# Patient Record
Sex: MALE | Race: WHITE | Employment: FULL TIME | URBAN - METROPOLITAN AREA
[De-identification: names, ages, dates, MRNs, and addresses within clinical notes are randomized per-mention and may not be internally consistent; named-entity substitution may affect disease eponyms.]

---

## 2018-06-20 ENCOUNTER — TELEPHONE (OUTPATIENT)
Dept: FAMILY MEDICINE CLINIC | Age: 23
End: 2018-06-20

## 2018-06-28 ENCOUNTER — OFFICE VISIT (OUTPATIENT)
Dept: FAMILY MEDICINE CLINIC | Age: 23
End: 2018-06-28

## 2018-06-28 VITALS
HEIGHT: 70 IN | WEIGHT: 146.6 LBS | SYSTOLIC BLOOD PRESSURE: 110 MMHG | BODY MASS INDEX: 20.99 KG/M2 | TEMPERATURE: 98 F | DIASTOLIC BLOOD PRESSURE: 60 MMHG | RESPIRATION RATE: 16 BRPM | HEART RATE: 64 BPM

## 2018-06-28 DIAGNOSIS — L30.9 ECZEMA, UNSPECIFIED TYPE: Primary | ICD-10-CM

## 2018-06-28 DIAGNOSIS — B36.0 TV (TINEA VERSICOLOR): ICD-10-CM

## 2018-06-28 DIAGNOSIS — B35.4 TINEA CORPORIS: ICD-10-CM

## 2018-06-28 DIAGNOSIS — K64.9 HEMORRHOIDS, UNSPECIFIED HEMORRHOID TYPE: ICD-10-CM

## 2018-06-28 PROCEDURE — 99203 OFFICE O/P NEW LOW 30 MIN: CPT | Performed by: NURSE PRACTITIONER

## 2018-06-28 PROCEDURE — 96372 THER/PROPH/DIAG INJ SC/IM: CPT | Performed by: NURSE PRACTITIONER

## 2018-06-28 RX ORDER — TRIAMCINOLONE ACETONIDE 1 MG/G
CREAM TOPICAL 2 TIMES DAILY
COMMUNITY
End: 2018-06-28

## 2018-06-28 RX ORDER — METHYLPREDNISOLONE ACETATE 80 MG/ML
120 INJECTION, SUSPENSION INTRA-ARTICULAR; INTRALESIONAL; INTRAMUSCULAR; SOFT TISSUE ONCE
Status: COMPLETED | OUTPATIENT
Start: 2018-06-28 | End: 2018-06-28

## 2018-06-28 RX ORDER — PREDNISONE 1 MG/1
TABLET ORAL
Qty: 30 TABLET | Refills: 0 | Status: SHIPPED | OUTPATIENT
Start: 2018-06-28 | End: 2018-07-08

## 2018-06-28 RX ORDER — LANOLIN ALCOHOL/MO/W.PET/CERES
CREAM (GRAM) TOPICAL PRN
COMMUNITY
End: 2018-12-18

## 2018-06-28 RX ORDER — CETIRIZINE HYDROCHLORIDE 10 MG/1
10 TABLET ORAL DAILY
Qty: 30 TABLET | Refills: 1 | Status: SHIPPED | OUTPATIENT
Start: 2018-06-28 | End: 2018-12-18

## 2018-06-28 RX ORDER — KETOCONAZOLE 20 MG/ML
SHAMPOO TOPICAL
Qty: 120 ML | Refills: 1 | Status: SHIPPED | OUTPATIENT
Start: 2018-06-28 | End: 2018-12-18

## 2018-06-28 RX ORDER — CLOBETASOL PROPIONATE 0.5 MG/G
OINTMENT TOPICAL
Qty: 60 G | Refills: 3 | Status: SHIPPED | OUTPATIENT
Start: 2018-06-28 | End: 2018-12-18

## 2018-06-28 RX ORDER — CLOBETASOL PROPIONATE 0.5 MG/G
CREAM TOPICAL 2 TIMES DAILY
COMMUNITY
End: 2018-06-28

## 2018-06-28 RX ORDER — FLUCONAZOLE 150 MG/1
150 TABLET ORAL
Qty: 10 TABLET | Refills: 0 | Status: SHIPPED | OUTPATIENT
Start: 2018-06-28 | End: 2018-07-28

## 2018-06-28 RX ORDER — CLOBETASOL PROPIONATE 0.5 MG/G
CREAM TOPICAL 2 TIMES DAILY
Qty: 3 TUBE | Refills: 3 | Status: CANCELLED | OUTPATIENT
Start: 2018-06-28

## 2018-06-28 RX ADMIN — METHYLPREDNISOLONE ACETATE 120 MG: 80 INJECTION, SUSPENSION INTRA-ARTICULAR; INTRALESIONAL; INTRAMUSCULAR; SOFT TISSUE at 08:47

## 2018-06-28 ASSESSMENT — PATIENT HEALTH QUESTIONNAIRE - PHQ9
2. FEELING DOWN, DEPRESSED OR HOPELESS: 0
SUM OF ALL RESPONSES TO PHQ9 QUESTIONS 1 & 2: 0
SUM OF ALL RESPONSES TO PHQ QUESTIONS 1-9: 0
1. LITTLE INTEREST OR PLEASURE IN DOING THINGS: 0

## 2018-06-28 NOTE — PROGRESS NOTES
After obtaining consent, and per orders of  Dominick Aguilar. CNP injection of depomedrol 120mg  given in Right deltoid by Corinne Stern. Patient instructed to  report any adverse reaction to me immediately.

## 2018-06-28 NOTE — PATIENT INSTRUCTIONS
Patient Education        Tinea Versicolor: Care Instructions  Your Care Instructions  Tinea versicolor is a skin infection caused by a yeast (fungus). It causes many small spots, usually on the chest and back. The spotted skin can be flaky or scaly. The spots do not tan in the sun, so they are lighter than the skin around them. Some spots may be darker than the skin around them. The yeast that causes tinea versicolor normally lives on your skin. But it becomes a problem only when warmth and humidity allow the yeast to grow rapidly and increase in number. Some people are more likely to get tinea versicolor. It does not spread from person to person. Tinea versicolor usually gets better as you age. You can treat tinea versicolor with cream or ointment that kills the yeast. You may need pills to kill the fungus if the spots cover a lot of your body. Although treatment kills the yeast quickly, your skin may not return to normal for months after treatment. You can get this condition again after treatment. Follow-up care is a key part of your treatment and safety. Be sure to make and go to all appointments, and call your doctor if you are having problems. It's also a good idea to know your test results and keep a list of the medicines you take. How can you care for yourself at home? · Follow the directions for use of creams, shampoos, or solutions. You will probably need to use them for 1 to 2 weeks. If your skin gets irritated, stop using the product, and call your doctor. · To prevent tinea versicolor, use a cream, shampoo, or solution one time a month. Your doctor may prescribe pills to prevent the spots from returning. · Dry off well after bathing. Keep your skin clean and dry. · Always wear sunscreen on exposed skin. Make sure to use a broad-spectrum sunscreen that has a sun protection factor (SPF) of 30 or higher. Use it every day, even when it is cloudy.   · If you keep getting tinea versicolor, wash your clothes in very hot water to kill the yeast.  When should you call for help? Call your doctor now or seek immediate medical care if:    · You have signs of infection such as:  ¨ Pain, warmth, or swelling in your skin. ¨ Red streaks near a wound in your skin. ¨ Pus coming from a wound in your skin. ¨ A fever.    Watch closely for changes in your health, and be sure to contact your doctor if:    · Your skin condition does not improve in 2 weeks.     · You do not get better as expected. Where can you learn more? Go to [email or web address]://[email or web address].[email or web address] org and sign in to your [employer] account. Enter Q482 in the iDentiMob box to learn more about \"Tinea Versicolor: Care Instructions. \"     If you do not have an account, please click on the \"Sign Up Now\" link. Current as of: October 5, 2017  Content Version: 11.6  © 7087-5278 [employer]. Care instructions adapted under license by [facility] ([facility]). If you have questions about a medical condition or this instruction, always ask your healthcare professional. [place] [address] any warranty or liability for your use of this information. Patient Education        Ringworm: Care Instructions  Your Care Instructions  Ringworm is a fungus infection of the skin. It is not caused by a worm. Ringworm causes a round, scaly rash that may crack and itch. The rash can spread over a wide area. One type of fungus that causes ringworm is often found in locker rooms and swimming pools. It grows well in warm, moist areas of the skin, such as in skin folds. You can get ringworm by sharing towels, clothing, and sports equipment. You can also get it by touching someone who has ringworm. Ringworm is treated with cream that kills the fungus. If the rash is widespread, you may need pills to get rid of it. Ringworm often comes back after treatment. If the rash becomes infected with bacteria, you may need antibiotics.   Follow-up care is a key link.  Current as of: October 5, 2017  Content Version: 11.6  © 7522-1614 [employer]. Care instructions adapted under license by [facility] ([facility]). If you have questions about a medical condition or this instruction, always ask your healthcare professional. [address] any warranty or liability for your use of this information. Patient Education        Atopic Dermatitis: Care Instructions  Your Care Instructions  Atopic dermatitis (also called eczema) is a skin problem that causes intense itching and a red, raised rash. In severe cases, the rash develops clear fluid-filled blisters. The rash is not contagious. People with this condition seem to have very sensitive immune systems that are likely to react to things that cause allergies. The immune system is the body's way of fighting infection. There is no cure for atopic dermatitis, but you may be able to control it with care at home. Follow-up care is a key part of your treatment and safety. Be sure to make and go to all appointments, and call your doctor if you are having problems. It's also a good idea to know your test results and keep a list of the medicines you take. How can you care for yourself at home? · Use moisturizer at least twice a day. · If your doctor prescribes a cream, use it as directed. If your doctor prescribes other medicine, take it exactly as directed. · Wash the affected area with water only. Soap can make dryness and itching worse. Pat dry. · Apply a moisturizer after bathing. Use a cream such as Lubriderm, Moisturel, or Cetaphil that does not irritate the skin or cause a rash. Apply the cream while your skin is still damp after lightly drying with a towel. · Use cold, wet cloths to reduce itching. · Keep cool, and stay out of the sun. · If itching affects your normal activities, an over-the-counter antihistamine, such as diphenhydramine (Benadryl) or loratadine (Claritin) may help.  Read and follow all instructions on the label. When should you call for help? Call your doctor now or seek immediate medical care if:    · Your rash gets worse and you have a fever.     · You have new blisters or bruises, or the rash spreads and looks like a sunburn.     · You have signs of infection, such as:  ¨ Increased pain, swelling, warmth, or redness. ¨ Red streaks leading from the rash. ¨ Pus draining from the rash. ¨ A fever.     · You have crusting or oozing sores.     · You have joint aches or body aches along with your rash.    Watch closely for changes in your health, and be sure to contact your doctor if:    · Your rash does not clear up after 2 to 3 weeks of home treatment.     · Itching interferes with your sleep or daily activities. Where can you learn more? Go to [email or web address].[email or web address] and sign in to your LearnUp account. Enter O324 in the [employer] box to learn more about \"Atopic Dermatitis: Care Instructions. \"     If you do not have an account, please click on the \"Sign Up Now\" link. Current as of: October 5, 2017  Content Version: 11.6  © 1502-7383 [employer]. Care instructions adapted under license by [facility] ([facility]). If you have questions about a medical condition or this instruction, always ask your healthcare professional. [place] [address] any warranty or liability for your use of this information. Patient Education        Hemorrhoids: Care Instructions  Your Care Instructions    Hemorrhoids are enlarged veins that develop in the anal canal. Bleeding during bowel movements, itching, swelling, and rectal pain are the most common symptoms. They can be uncomfortable at times, but hemorrhoids rarely are a serious problem. You can treat most hemorrhoids with simple changes to your diet and bowel habits. These changes include eating more fiber and not straining to pass stools.  Most hemorrhoids do not need surgery or other treatment unless they are very large and painful or bleed a lot. Follow-up care is a key part of your treatment and safety. Be sure to make and go to all appointments, and call your doctor if you are having problems. It's also a good idea to know your test results and keep a list of the medicines you take. How can you care for yourself at home? · Sit in a few inches of warm water (sitz bath) 3 times a day and after bowel movements. The warm water helps with pain and itching. · Put ice on your anal area several times a day for 10 minutes at a time. Put a thin cloth between the ice and your skin. Follow this by placing a warm, wet towel on the area for another 10 to 20 minutes. · Take pain medicines exactly as directed. ¨ If the doctor gave you a prescription medicine for pain, take it as prescribed. ¨ If you are not taking a prescription pain medicine, ask your doctor if you can take an over-the-counter medicine. · Keep the anal area clean, but be gentle. Use water and a fragrance-free soap, such as [place], or use baby wipes or medicated pads, such as Tucks. · Wear cotton underwear and loose clothing to decrease moisture in the anal area. · Eat more fiber. Include foods such as whole-grain breads and cereals, raw vegetables, raw and dried fruits, and beans. · Drink plenty of fluids, enough so that your urine is light yellow or clear like water. If you have kidney, heart, or liver disease and have to limit fluids, talk with your doctor before you increase the amount of fluids you drink. · Use a stool softener that contains bran or psyllium. You can save money by buying bran or psyllium (available in bulk at most health food stores) and sprinkling it on foods or stirring it into fruit juice. Or you can use a product such as Metamucil or Hydrocil. · Practice healthy bowel habits. ¨ Go to the bathroom as soon as you have the urge. ¨ Avoid straining to pass stools.  Relax and give yourself time to let things

## 2018-07-05 ASSESSMENT — ENCOUNTER SYMPTOMS
EYES NEGATIVE: 1
ANAL BLEEDING: 0
GASTROINTESTINAL NEGATIVE: 1
BLOOD IN STOOL: 0
RESPIRATORY NEGATIVE: 1

## 2018-07-05 NOTE — PROGRESS NOTES
(ZYRTEC ALLERGY) 10 MG tablet Take 1 tablet by mouth daily 30 tablet 1    fluconazole (DIFLUCAN) 150 MG tablet Take 1 tablet by mouth every 3 days 10 tablet 0    hydrocortisone 2.5 % cream Apply topically 2 times daily. 1 Tube 1     No current facility-administered medications for this visit. No Known Allergies  Health Maintenance   Topic Date Due    HIV screen  2012    Meningococcal (MCV) Vaccine Age 0-22 Years (1 of 1) 2013    DTaP/Tdap/Td vaccine (1 - Tdap) 2016    Flu vaccine (1) 2018         Objective:     Physical Exam   Constitutional: He is oriented to person, place, and time. He appears well-developed and well-nourished. HENT:   Head: Normocephalic. Mouth/Throat: Oropharynx is clear and moist.   Eyes: Conjunctivae are normal.   Neck: Neck supple. Cardiovascular: Normal rate, regular rhythm, normal heart sounds and intact distal pulses. Pulmonary/Chest: Effort normal and breath sounds normal.   Abdominal: Soft. Musculoskeletal: Normal range of motion. Neurological: He is alert and oriented to person, place, and time. Skin: Skin is warm and dry. Rash noted. There is erythema. Confluent hyperpigmented  Rash to trunk    Psychiatric: He has a normal mood and affect. Nursing note and vitals reviewed. /60   Pulse 64   Temp 98 °F (36.7 °C) (Oral)   Resp 16   Ht 5' 10\" (1.778 m)   Wt 146 lb 9.6 oz (66.5 kg)   BMI 21.03 kg/m²       Impression/Plan:  1. Eczema, unspecified type    2. Tinea corporis    3. TV (tinea versicolor)    4. Hemorrhoids, unspecified hemorrhoid type      Requested Prescriptions     Signed Prescriptions Disp Refills    ketoconazole (NIZORAL) 2 % shampoo 120 mL 1     Sig: Apply topically daily as needed.  clobetasol (TEMOVATE) 0.05 % ointment 60 g 3     Sig: Apply topically 2 times daily.     predniSONE (DELTASONE) 5 MG tablet 30 tablet 0     Si po qd for 3 days, then 3 po qd for 3 days, then 2 po qd for 3 days, then 1 po qd for 3 days    cetirizine (ZYRTEC ALLERGY) 10 MG tablet 30 tablet 1     Sig: Take 1 tablet by mouth daily    fluconazole (DIFLUCAN) 150 MG tablet 10 tablet 0     Sig: Take 1 tablet by mouth every 3 days     No orders of the defined types were placed in this encounter. Patient given educational materials - see patient instructions. Discussed use, benefit, and side effects of prescribed medications. All patient questions answered. Pt voiced understanding. Reviewed health maintenance. Patient agreed with treatment plan. Follow up as directed.           Electronically signed by GABRIEL Logan CNP on 7/5/2018 at 8:58 AM

## 2018-12-18 ENCOUNTER — OFFICE VISIT (OUTPATIENT)
Dept: FAMILY MEDICINE CLINIC | Age: 23
End: 2018-12-18

## 2018-12-18 VITALS
TEMPERATURE: 98.9 F | RESPIRATION RATE: 16 BRPM | WEIGHT: 155 LBS | SYSTOLIC BLOOD PRESSURE: 100 MMHG | HEART RATE: 72 BPM | BODY MASS INDEX: 21.7 KG/M2 | DIASTOLIC BLOOD PRESSURE: 60 MMHG | HEIGHT: 71 IN

## 2018-12-18 DIAGNOSIS — L30.9 ECZEMA, UNSPECIFIED TYPE: Primary | ICD-10-CM

## 2018-12-18 PROCEDURE — 99213 OFFICE O/P EST LOW 20 MIN: CPT | Performed by: NURSE PRACTITIONER

## 2018-12-18 PROCEDURE — 96372 THER/PROPH/DIAG INJ SC/IM: CPT | Performed by: NURSE PRACTITIONER

## 2018-12-18 RX ORDER — CETIRIZINE HYDROCHLORIDE 10 MG/1
10 TABLET ORAL DAILY
Qty: 30 TABLET | Refills: 5 | Status: SHIPPED | OUTPATIENT
Start: 2018-12-18 | End: 2019-10-30 | Stop reason: SDUPTHER

## 2018-12-18 RX ORDER — METHYLPREDNISOLONE ACETATE 80 MG/ML
120 INJECTION, SUSPENSION INTRA-ARTICULAR; INTRALESIONAL; INTRAMUSCULAR; SOFT TISSUE ONCE
Status: COMPLETED | OUTPATIENT
Start: 2018-12-18 | End: 2018-12-18

## 2018-12-18 RX ADMIN — METHYLPREDNISOLONE ACETATE 120 MG: 80 INJECTION, SUSPENSION INTRA-ARTICULAR; INTRALESIONAL; INTRAMUSCULAR; SOFT TISSUE at 09:41

## 2018-12-18 NOTE — PATIENT INSTRUCTIONS
you care for yourself at home? Showers and baths  · Keep showers and baths short, and use warm or lukewarm water. Don't use hot water. It takes off more of your skin's natural oils. · Use as little soap as you can. Choose a mild soap, such as Dove, Cetaphil, or Neutrogena. Or use a skin cleanser like Aquanil or Cetaphil. · If you are taking a bath, use soap only at the very end. Then rinse off all traces of soap with fresh water. Gently pat your skin dry with a towel. Skin creams and moisturizers  · Apply moisturizer or skin cream right away (within 3 minutes) after a bath or shower. Use a moisturizer at other times too, as often as you need it. · Moisturizing creams are better than lotions. Try brands like CeraVe cream, Cetaphil cream, or Eucerin cream.  Other tips  · When washing clothes, use a small amount of detergent. Don't use fabric softeners or dryer sheets. · For small areas of itchy skin, try an over-the-counter 1% hydrocortisone cream.  · If you have very dry hands, spread petroleum jelly (such as Vaseline) on your hands before bed. Wear thin cotton gloves while you sleep. If your feet are dry, spread Vaseline on them and wear socks while you sleep. When should you call for help? Call your doctor now or seek immediate medical care if:    · You have signs of infection, such as:  ? Pain, warmth, or swelling in the skin. ? Red streaks near a wound in the skin. ? Pus coming from a wound in your skin. ? A fever.    Watch closely for changes in your health, and be sure to contact your doctor if:    · You do not get better as expected. Where can you learn more? Go to https://Cryptopay.SEJENT. org and sign in to your [employer] account. Enter U223 in the [facility] box to learn more about \"Dry Skin: Care Instructions. \"     If you do not have an account, please click on the \"Sign Up Now\" link.   Current as of: April 18, 2018  Content Version: 11.8  © 1754-4883 Healthwise,

## 2018-12-28 NOTE — PROGRESS NOTES
09/01/2018         Objective:     Physical Exam   Constitutional: He is oriented to person, place, and time. He appears well-developed and well-nourished. HENT:   Head: Normocephalic. Right Ear: External ear normal.   Left Ear: External ear normal.   Mouth/Throat: Oropharynx is clear and moist.   Eyes: Pupils are equal, round, and reactive to light. Conjunctivae are normal.   Neck: Normal range of motion. Cardiovascular: Normal rate, regular rhythm, normal heart sounds and intact distal pulses. Pulmonary/Chest: Effort normal and breath sounds normal.   Abdominal: Soft. Musculoskeletal: Normal range of motion. Neurological: He is alert and oriented to person, place, and time. Skin: Rash noted. Erythematous, eczematous, confluent rash noted to arms and chest   Psychiatric: He has a normal mood and affect. Nursing note and vitals reviewed. /60   Pulse 72   Temp 98.9 °F (37.2 °C) (Oral)   Resp 16   Ht 5' 11\" (1.803 m)   Wt 155 lb (70.3 kg)   BMI 21.62 kg/m²       Impression/Plan:  1. Eczema, unspecified type      Requested Prescriptions     Signed Prescriptions Disp Refills    cetirizine (ZYRTEC ALLERGY) 10 MG tablet 30 tablet 5     Sig: Take 1 tablet by mouth daily    hydrocortisone 2.5 % cream 1 Tube 2     Sig: Apply topically 2 times daily. No orders of the defined types were placed in this encounter. Patient giveneducational materials - see patient instructions. Discussed use, benefit, and side effects of prescribed medications. All patient questions answered. Pt voiced understanding. Reviewed health maintenance. Patient agreedwith treatment plan. Follow up as directed. Continue medications as prescribed.  Educational information on above diagnosis  provided per AVS.         Electronically signed by GABRIEL Luna CNP on 12/28/2018 at 9:21 AM

## 2019-10-31 RX ORDER — CETIRIZINE HYDROCHLORIDE 10 MG/1
10 TABLET ORAL DAILY
Qty: 30 TABLET | Refills: 1 | Status: SHIPPED | OUTPATIENT
Start: 2019-10-31 | End: 2020-08-13 | Stop reason: SDUPTHER

## 2020-02-17 ENCOUNTER — OFFICE VISIT (OUTPATIENT)
Dept: FAMILY MEDICINE CLINIC | Age: 25
End: 2020-02-17

## 2020-02-17 VITALS
HEART RATE: 66 BPM | BODY MASS INDEX: 21.29 KG/M2 | HEIGHT: 72 IN | RESPIRATION RATE: 16 BRPM | SYSTOLIC BLOOD PRESSURE: 115 MMHG | DIASTOLIC BLOOD PRESSURE: 64 MMHG | TEMPERATURE: 98.1 F | WEIGHT: 157.2 LBS

## 2020-02-17 PROCEDURE — 99213 OFFICE O/P EST LOW 20 MIN: CPT | Performed by: NURSE PRACTITIONER

## 2020-02-17 SDOH — ECONOMIC STABILITY: FOOD INSECURITY: WITHIN THE PAST 12 MONTHS, THE FOOD YOU BOUGHT JUST DIDN'T LAST AND YOU DIDN'T HAVE MONEY TO GET MORE.: NEVER TRUE

## 2020-02-17 SDOH — ECONOMIC STABILITY: INCOME INSECURITY: HOW HARD IS IT FOR YOU TO PAY FOR THE VERY BASICS LIKE FOOD, HOUSING, MEDICAL CARE, AND HEATING?: NOT HARD AT ALL

## 2020-02-17 SDOH — ECONOMIC STABILITY: FOOD INSECURITY: WITHIN THE PAST 12 MONTHS, YOU WORRIED THAT YOUR FOOD WOULD RUN OUT BEFORE YOU GOT MONEY TO BUY MORE.: NEVER TRUE

## 2020-02-17 SDOH — ECONOMIC STABILITY: TRANSPORTATION INSECURITY
IN THE PAST 12 MONTHS, HAS THE LACK OF TRANSPORTATION KEPT YOU FROM MEDICAL APPOINTMENTS OR FROM GETTING MEDICATIONS?: NO

## 2020-02-17 SDOH — ECONOMIC STABILITY: TRANSPORTATION INSECURITY
IN THE PAST 12 MONTHS, HAS LACK OF TRANSPORTATION KEPT YOU FROM MEETINGS, WORK, OR FROM GETTING THINGS NEEDED FOR DAILY LIVING?: NO

## 2020-02-17 ASSESSMENT — PATIENT HEALTH QUESTIONNAIRE - PHQ9
SUM OF ALL RESPONSES TO PHQ QUESTIONS 1-9: 0
SUM OF ALL RESPONSES TO PHQ QUESTIONS 1-9: 0
SUM OF ALL RESPONSES TO PHQ9 QUESTIONS 1 & 2: 0
1. LITTLE INTEREST OR PLEASURE IN DOING THINGS: 0
2. FEELING DOWN, DEPRESSED OR HOPELESS: 0

## 2020-02-17 ASSESSMENT — ENCOUNTER SYMPTOMS
RESPIRATORY NEGATIVE: 1
EYES NEGATIVE: 1
GASTROINTESTINAL NEGATIVE: 1

## 2020-02-17 NOTE — PROGRESS NOTES
[address] [place] [address]  Dept: [phone]  Dept Fax: [phone]  Loc: [phone]  PROGRESS NOTE      VisitDate: 2/17/2020    Israel Lopez is a 27 y.o. 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 Called off. Requesting return to work slip. Reports symptoms have resolved. Denies any vomiting, fever, blood in stools or travel. reports several episodes of hive formation on chest after eating bananas. Moderate relief with benadryl use. Denies any angioedema, sob. Requesting allergy testing. Review of Systems   Constitutional: Negative. HENT: Negative. Eyes: Negative. Respiratory: Negative. Cardiovascular: Negative. Gastrointestinal: Negative. Endocrine: Negative. Genitourinary: Negative. Musculoskeletal: Negative. Skin: Negative. Neurological: Negative. Hematological: Negative. Psychiatric/Behavioral: Negative. No past medical history on file. No past surgical history on file. Family History   Problem Relation Age of Onset    High Blood Pressure Father     Heart Disease Paternal Grandmother     Cancer Paternal Grandmother      Social History     Tobacco Use    Smoking status: Never Smoker    Smokeless tobacco: Never Used   Substance Use Topics    Alcohol use: No      Current Outpatient Medications   Medication Sig Dispense Refill    hydrocortisone 2.5 % cream Apply topically 2 times daily.  1 Tube 2    cetirizine (ZYRTEC ALLERGY) 10 MG tablet Take 1 tablet by mouth daily 30 tablet 1     No current facility-administered medications for this visit. Allergies   Allergen Reactions    Banana Extract Allergy Skin Test Hives and Swelling     Health Maintenance   Topic Date Due    Varicella vaccine (1 of 2 - 2-dose childhood series) 08/21/1998    DTaP/Tdap/Td vaccine (1 - Tdap) 08/21/2008    HIV screen  08/21/2012    Flu vaccine (1) 09/01/2019    Shingles Vaccine (1 of 2) 08/21/2047    Hepatitis A vaccine  Aged Out    Hepatitis B vaccine  Aged Out    Hib vaccine  Aged Out    HPV vaccine  Aged Out    Meningococcal (ACWY) vaccine  Aged Out    Pneumococcal 0-64 years Vaccine  Aged Out         Objective:     Physical Exam  Vitals signs and nursing note reviewed. Constitutional:       Appearance: Normal appearance. He is normal weight. HENT:      Right Ear: Tympanic membrane normal.      Left Ear: Tympanic membrane normal.      Nose: Nose normal.      Mouth/Throat:      Pharynx: Oropharynx is clear. Eyes:      Conjunctiva/sclera: Conjunctivae normal.      Pupils: Pupils are equal, round, and reactive to light. Neck:      Musculoskeletal: Normal range of motion and neck supple. Cardiovascular:      Rate and Rhythm: Normal rate and regular rhythm. Pulses: Normal pulses. Heart sounds: Normal heart sounds. Pulmonary:      Effort: Pulmonary effort is normal.      Breath sounds: Normal breath sounds. Abdominal:      General: Bowel sounds are normal.      Palpations: Abdomen is soft. Musculoskeletal: Normal range of motion. Skin:     General: Skin is warm. Capillary Refill: Capillary refill takes 2 to 3 seconds. Neurological:      General: No focal deficit present. Mental Status: He is alert and oriented to person, place, and time. Psychiatric:         Mood and Affect: Mood normal.         Behavior: Behavior normal.         Thought Content:  Thought content normal.         Judgment: Judgment normal.       /64 (Site: Right Upper Arm)   Pulse 66   Temp 98.1 °F (36.7 °C) (Oral)   Resp 16   Ht 6' (1.829

## 2020-02-17 NOTE — PATIENT INSTRUCTIONS
severe allergic reaction.    After you give an epinephrine shot, call  911, even if you feel better.   Call 911 anytime you think you may need emergency care. For example, call if:    · You have symptoms of a severe allergic reaction. These may include:  ? Sudden raised, red areas (hives) all over your body. ? Swelling of the throat, mouth, lips, or tongue. ? Trouble breathing. ? Passing out (losing consciousness). Or you may feel very lightheaded or suddenly feel weak, confused, or restless.     · You have been given an epinephrine shot, even if you feel better.    Call your doctor now or seek immediate medical care if:    · You have symptoms of an allergic reaction, such as:  ? A rash or hives (raised, red areas on the skin). ? Itching. ? Swelling. ? Belly pain, nausea, or vomiting.    Watch closely for changes in your health, and be sure to contact your doctor if:    · You do not get better as expected. Where can you learn more? Go to https://Answers Corporation.Safecare. org and sign in to your [employer] account. Enter A762 in the Glooko box to learn more about \"Food Allergy: Care Instructions. \"     If you do not have an account, please click on the \"Sign Up Now\" link. Current as of: April 7, 2019  Content Version: 12.3  © 1048-5588 Healthwise, Incorporated. Care instructions adapted under license by [facility] ([facility]). If you have questions about a medical condition or this instruction, always ask your healthcare professional. [place] [address] any warranty or liability for your use of this information.

## 2020-08-13 ENCOUNTER — OFFICE VISIT (OUTPATIENT)
Dept: FAMILY MEDICINE CLINIC | Age: 25
End: 2020-08-13

## 2020-08-13 VITALS
WEIGHT: 156 LBS | HEART RATE: 68 BPM | BODY MASS INDEX: 21.84 KG/M2 | DIASTOLIC BLOOD PRESSURE: 66 MMHG | HEIGHT: 71 IN | SYSTOLIC BLOOD PRESSURE: 100 MMHG | TEMPERATURE: 97.9 F | RESPIRATION RATE: 16 BRPM

## 2020-08-13 PROBLEM — Z52.001 STEM CELL DONOR: Status: ACTIVE | Noted: 2019-11-11

## 2020-08-13 PROCEDURE — 99213 OFFICE O/P EST LOW 20 MIN: CPT | Performed by: NURSE PRACTITIONER

## 2020-08-13 RX ORDER — CETIRIZINE HYDROCHLORIDE 10 MG/1
10 TABLET ORAL DAILY
Qty: 30 TABLET | Refills: 1 | Status: SHIPPED | OUTPATIENT
Start: 2020-08-13 | End: 2020-08-13 | Stop reason: SDUPTHER

## 2020-08-13 RX ORDER — CETIRIZINE HYDROCHLORIDE 10 MG/1
10 TABLET ORAL DAILY
Qty: 30 TABLET | Refills: 11 | Status: SHIPPED | OUTPATIENT
Start: 2020-08-13

## 2020-08-13 NOTE — PATIENT INSTRUCTIONS
Elevation). \"     If you do not have an account, please click on the \"Sign Up Now\" link. Current as of: March 2, 2020               Content Version: 12.5 © 2006-2020 [employer]. Care instructions adapted under license by [employer] ([facility]). If you have questions about a medical condition or this instruction, always ask your healthcare professional. [employer] any warranty or liability for your use of this information. Patient Education         How to Wrap a Sprained Ankle (01:32)  Your health professional recommends that you watch this short online health video. Learn how to use a compression wrap for a sprained ankle to help control swelling. How to watch the video    Scan the QR code   OR Visit the website    [email or web address]   Current as of: March 2, 2020               Content Version: 12.5 © 2006-2020 Healthwise, [employer]. Care instructions adapted under license by [employer] ([facility]). If you have questions about a medical condition or this instruction, always ask your healthcare professional. [employer] any warranty or liability for your use of this information. Patient Education        Ankle Sprain: Rehab Exercises  Introduction  Here are some examples of exercises for you to try. The exercises may be suggested for a condition or for rehabilitation. Start each exercise slowly. Ease off the exercises if you start to have pain. You will be told when to start these exercises and which ones will work best for you. How to do the exercises  \"Alphabet\" exercise   1. Trace the alphabet with your toe. This helps your ankle move in all directions. Side-to-side knee swing exercise   1. Sit in a chair with your foot flat on the floor. 2. Slowly move your knee from side to side. Keep your foot pressed flat. 3. Continue this exercise for 2 to 3 minutes. Towel curl   1. While sitting, place your foot on a towel on the floor. can you learn more? Go to [email or web address] org and sign in to your Jia[employer] account. Enter Loni Brown in the [facility] box to learn more about \"Ankle Sprain: Rehab Exercises. \"     If you do not have an account, please click on the \"Sign Up Now\" link. Current as of: March 2, 2020               Content Version: 12.5  © 2006-2020 [employer]. Care instructions adapted under license by [facility] ([facility]). If you have questions about a medical condition or this instruction, always ask your healthcare professional. [place] [address] any warranty or liability for your use of this information. Patient Education        Ankle Sprain: Care Instructions  Your Care Instructions     An ankle sprain can happen when you twist your ankle. The ligaments that support the ankle can get stretched and torn. Often the ankle is swollen and painful. Ankle sprains may take from several weeks to several months to heal. Usually, the more pain and swelling you have, the more severe your ankle sprain is and the longer it will take to heal. You can heal faster and regain strength in your ankle with good home treatment. It is very important to give your ankle time to heal completely, so that you do not easily hurt your ankle again. Follow-up care is a key part of your treatment and safety. Be sure to make and go to all appointments, and call your doctor if you are having problems. It's also a good idea to know your test results and keep a list of the medicines you take. How can you care for yourself at home? · Prop up your foot on pillows as much as possible for the next 3 days. Try to keep your ankle above the level of your heart. This will help reduce the swelling. · Follow your doctor's directions for wearing a splint or elastic bandage. Wrapping the ankle may help reduce or prevent swelling.   · Your doctor may give you a splint, a brace, an air stirrup, or another form of ankle support to protect your ankle until it is healed. Wear it as directed while your ankle is healing. Do not remove it unless your doctor tells you to. After your ankle has healed, ask your doctor whether you should wear the brace when you exercise. · Put ice or cold packs on your injured ankle for 10 to 20 minutes at a time. Try to do this every 1 to 2 hours for the next 3 days (when you are awake) or until the swelling goes down. Put a thin cloth between the ice and your skin. · You may need to use crutches until you can walk without pain. If you do use crutches, try to bear some weight on your injured ankle if you can do so without pain. This helps the ankle heal.  · Take pain medicines exactly as directed. ? If the doctor gave you a prescription medicine for pain, take it as prescribed. ? If you are not taking a prescription pain medicine, ask your doctor if you can take an over-the-counter medicine. · If you have been given ankle exercises to do at home, do them exactly as instructed. These can promote healing and help prevent lasting weakness. When should you call for help? Call your doctor now or seek immediate medical care if:  · Your pain is getting worse. · Your swelling is getting worse. · Your splint feels too tight or you are unable to loosen it. Watch closely for changes in your health, and be sure to contact your doctor if:  · You are not getting better after 1 week. Where can you learn more? Go to [email or web address] [email or web address] org and sign in to your [facility] account. Enter V708 in the [employer] box to learn more about \"Ankle Sprain: Care Instructions. \"     If you do not have an account, please click on the \"Sign Up Now\" link. Current as of: March 2, 2020               Content Version: 12.5  © 8655-8201 Healthwise, Inc[employer]. Care instructions adapted under license by [facility] ([facility]).  If you have questions about a medical condition or this instruction, always ask your healthcare professional. [place] [address] any warranty or liability for your use of this information. Patient Education        Lipoma: Care Instructions  Your Care Instructions  A lipoma is a growth of fat just below the skin. It may feel soft and rubbery. Lipomas can occur anywhere on the body. But they are most common on the torso, neck, upper thighs, upper arms, and armpits. A lipoma does not turn into cancer. Lipomas usually are not treated, because most of them don't hurt or cause problems. But your doctor may remove a lipoma if it is painful, gets infected, or bothers you. Follow-up care is a key part of your treatment and safety. Be sure to make and go to all appointments, and call your doctor if you are having problems. It's also a good idea to know your test results and keep a list of the medicines you take. How can you care for yourself at home? · A lipoma usually needs no care at home unless your doctor made a cut (incision) to remove it. · If your doctor told you how to care for your incision, follow your doctor's instructions. If you did not get instructions, follow this general advice:  ? Wash around the incision with clean water 2 times a day. Don't use hydrogen peroxide or alcohol. These can slow healing. ? You may cover the incision with a thin layer of petroleum jelly, such as Vaseline, and a nonstick bandage. ? Apply more petroleum jelly and replace the bandage as needed. When should you call for help? Call your doctor now or seek immediate medical care if:  · You have signs of infection, such as:  ? Increased pain, swelling, warmth, or redness. ? Red streaks leading from the lipoma. ? Pus draining from the lipoma. ? A fever. Watch closely for changes in your health, and be sure to contact your doctor if:  · The lipoma is growing or changing. · You do not get better as expected. Where can you learn more?   Go to [email or web address] and sign in to your [employer] account. Enter H796 in the [place] box to learn more about \"Lipoma: Care Instructions. \"     If you do not have an account, please click on the \"Sign Up Now\" link. Current as of: October 31, 2019               Content Version: 12.5  © 0316-0371 Healthwise, Incorporated. Care instructions adapted under license by [facility] ([facility]). If you have questions about a medical condition or this instruction, always ask your healthcare professional. [address] any warranty or liability for your use of this information.

## 2020-08-13 NOTE — LETTER
[address]  [address]  [facility] [address]  Phone: [phone]  Fax: [phone]    GABRIEL Schwartz CNP        August 13, 2020     Patient: Gomez Wright   YOB: 1995   Date of Visit: 8/13/2020       To Whom It May Concern: It is my medical opinion that Michel Pollock may be excused from work 8-13-20 thru 8-16-20 and return to work 8-17-20 for a non-covid related illness. If you have any questions or concerns, please don't hesitate to call.     Sincerely,        GABRIEL Schwartz CNP

## 2020-08-17 NOTE — PROGRESS NOTES
[place] [address] [address] [facility] [address]  Dept: [phone]  Dept Fax: [phone]  Loc: [phone]  PROGRESS NOTE      VisitDate: 8/13/2020    Staci Randall is a 27 y.o. male who presents today for:     Chief Complaint   Patient presents with    Ankle Injury     twisted his ankle on Tuesday and it is not getting much better. He is icing it and elevating it.  Cyst     He has a little on on his left forearm and 2 on his right arm near the axilla. They never become red or irritated. He has been told that his dad has \"fatty tumors\". Subjective:  Patient complains of ankle injury while playing basketball on Tuesday. Reports that his ankle left ankle rotated inward. Complains of continued swelling, bruising. Requesting a work slip. Applying ice and taking over-the-counter NSAIDs minimal relief. Patient also complains of a lump to the left forearm as well as a small lump that he palpated in his right armpit. Denies any tenderness. Review of Systems   Musculoskeletal: Positive for gait problem. All other systems reviewed and are negative. History reviewed. No pertinent past medical history. History reviewed. No pertinent surgical history. Family History   Problem Relation Age of Onset    High Blood Pressure Father     Heart Disease Paternal Grandmother     Cancer Paternal Grandmother      Social History     Tobacco Use    Smoking status: Never Smoker    Smokeless tobacco: Never Used   Substance Use Topics    Alcohol use: No      Current Outpatient Medications   Medication Sig Dispense Refill    cetirizine (ZYRTEC ALLERGY) 10 MG tablet Take 1 tablet by mouth daily 30 tablet 11     No current facility-administered medications for this visit.       Allergies   Allergen Reactions    Banana Extract Allergy Skin Test Hives and Swelling     Health Maintenance   Topic Date Due    Varicella vaccine (1 of 2 - 2-dose childhood series) 08/21/1998    HPV vaccine (1 - Male 2-dose series) 08/21/2008    HIV screen  08/21/2012    DTaP/Tdap/Td vaccine (1 - Tdap) 08/21/2016    Flu vaccine (1) 09/01/2020    Hepatitis A vaccine  Aged Out    Hepatitis B vaccine  Aged Out    Hib vaccine  Aged Out    Meningococcal (ACWY) vaccine  Aged Out    Pneumococcal 0-64 years Vaccine  Aged Out         Objective:     Physical Exam  Constitutional:       Appearance: Normal appearance. He is well-developed and normal weight. He is not diaphoretic. HENT:      Head: Normocephalic and atraumatic. Not macrocephalic and not microcephalic. Right Ear: Hearing, tympanic membrane, ear canal and external ear normal. No drainage or tenderness. No middle ear effusion. No hemotympanum. Tympanic membrane is not injected, scarred, perforated or bulging. Left Ear: Hearing, tympanic membrane, ear canal and external ear normal. No drainage or tenderness. No middle ear effusion. No hemotympanum. Tympanic membrane is not injected, scarred, perforated or bulging. Nose: Nose normal. No nasal deformity, septal deviation, mucosal edema or rhinorrhea. Right Sinus: No maxillary sinus tenderness or frontal sinus tenderness. Left Sinus: No maxillary sinus tenderness or frontal sinus tenderness. Mouth/Throat:      Mouth: No oral lesions. Dentition: Normal dentition. Does not have dentures. No dental caries or dental abscesses. Pharynx: Oropharynx is clear. No oropharyngeal exudate or posterior oropharyngeal erythema. Tonsils: No tonsillar abscesses. Eyes:      General: Lids are normal. No scleral icterus. Extraocular Movements:      Right eye: Normal extraocular motion. Left eye: Normal extraocular motion. Conjunctiva/sclera: Conjunctivae normal.      Right eye: Right conjunctiva is not injected. Left eye: Left conjunctiva is not injected.    Neck:      Musculoskeletal: Normal range of motion and neck supple. Normal range of motion. No edema or erythema. Thyroid: No thyroid mass or thyromegaly. Vascular: No carotid bruit or JVD. Trachea: Trachea normal.   Cardiovascular:      Rate and Rhythm: Normal rate and regular rhythm. Pulses: Normal pulses. Heart sounds: Normal heart sounds, S1 normal and S2 normal. No murmur. No friction rub. No gallop. Pulmonary:      Effort: Pulmonary effort is normal. No respiratory distress. Breath sounds: Normal breath sounds. No wheezing, rhonchi or rales. Chest:      Chest wall: No tenderness. Abdominal:      General: Bowel sounds are normal.      Palpations: Abdomen is soft. There is no hepatomegaly, splenomegaly or mass. Tenderness: There is no guarding or rebound. Hernia: No hernia is present. There is no hernia in the ventral area or left inguinal area. Musculoskeletal:      Left ankle: He exhibits decreased range of motion, swelling and ecchymosis. He exhibits no deformity. Tenderness. Lateral malleolus tenderness found. Achilles tendon exhibits normal Pelletier's test results. Feet:    Lymphadenopathy:      Head:      Right side of head: No submental, submandibular, tonsillar, preauricular, posterior auricular or occipital adenopathy. Left side of head: No submental, submandibular, tonsillar, preauricular, posterior auricular or occipital adenopathy. Cervical: No cervical adenopathy. Right cervical: No superficial, deep or posterior cervical adenopathy. Left cervical: No superficial, deep or posterior cervical adenopathy. Upper Body:      Right upper body: No supraclavicular or pectoral adenopathy. Left upper body: No supraclavicular or pectoral adenopathy. Skin:     General: Skin is warm and dry. Coloration: Skin is not pale. Findings: No bruising, ecchymosis, laceration, lesion or rash. Nails: There is no clubbing.                Comments: Tiny nodular formation left forearm, superficial, nontender, fixed. Approximate 0.5 cm x 0.5 cm. Palpable lymph node noted to right axilla no adjacent lymphadenopathy. No erythema or tenderness. Node approximately 0.5 cm x 0.5 cm. Neurological:      General: No focal deficit present. Mental Status: He is alert and oriented to person, place, and time. Cranial Nerves: No cranial nerve deficit. Motor: No abnormal muscle tone. Coordination: Coordination normal.      Deep Tendon Reflexes: Reflexes normal.   Psychiatric:         Speech: Speech normal.         Behavior: Behavior normal.         Thought Content: Thought content normal.         Judgment: Judgment normal.       /66   Pulse 68   Temp 97.9 °F (36.6 °C) (Infrared)   Resp 16   Ht 5' 10.5\" (1.791 m)   Wt 156 lb (70.8 kg)   BMI 22.07 kg/m²       Impression/Plan:  1. Sprain of left ankle, unspecified ligament, initial encounter      Requested Prescriptions     Signed Prescriptions Disp Refills    cetirizine (ZYRTEC ALLERGY) 10 MG tablet 30 tablet 11     Sig: Take 1 tablet by mouth daily     No orders of the defined types were placed in this encounter. Patient giveneducational materials - see patient instructions. Discussed use, benefit, and side effects of prescribed medications. All patient questions answered. Pt voiced understanding. Reviewed health maintenance. Patient agreedwith treatment plan. Follow up as directed. Ankle sprain information provided. Vince. [employer] wrap's. Off work next 2 days.        Electronically signed by GABRIEL Woods CNP on 8/17/2020 at 10:09 AM

## 2021-04-02 ENCOUNTER — VIRTUAL VISIT (OUTPATIENT)
Dept: FAMILY MEDICINE CLINIC | Age: 26
End: 2021-04-02

## 2021-04-02 DIAGNOSIS — Z86.16 HISTORY OF 2019 NOVEL CORONAVIRUS DISEASE (COVID-19): Primary | ICD-10-CM

## 2021-04-02 DIAGNOSIS — R11.0 NAUSEA WITHOUT VOMITING: ICD-10-CM

## 2021-04-02 DIAGNOSIS — R53.83 FATIGUE, UNSPECIFIED TYPE: ICD-10-CM

## 2021-04-02 DIAGNOSIS — Z11.59 SCREENING FOR VIRAL DISEASE: ICD-10-CM

## 2021-04-02 PROCEDURE — 99213 OFFICE O/P EST LOW 20 MIN: CPT | Performed by: NURSE PRACTITIONER

## 2021-04-02 ASSESSMENT — ENCOUNTER SYMPTOMS
COUGH: 0
SHORTNESS OF BREATH: 0
SINUS PRESSURE: 0
SINUS PAIN: 0
NAUSEA: 1
DIARRHEA: 0

## 2021-04-02 NOTE — PROGRESS NOTES
EOM    [x]  Normal    [] Abnormal -   Sclera  [x]  Normal    [] Abnormal -          Discharge [x]  None visible   [] Abnormal -     HENT: [x] Normocephalic, atraumatic  [] Abnormal -   [x] Mouth/Throat: Mucous membranes are moist    External Ears [x] Normal  [] Abnormal -    Neck: [x] No visualized mass [] Abnormal -     Pulmonary/Chest: [x] Respiratory effort normal   [x] No visualized signs of difficulty breathing or respiratory distress        [] Abnormal -      Musculoskeletal:   [x] Normal gait with no signs of ataxia         [x] Normal range of motion of neck        [] Abnormal -     Neurological:        [x] No Facial Asymmetry (Cranial nerve 7 motor function) (limited exam due to video visit)          [x] No gaze palsy        [] Abnormal -          Skin:        [x] No significant exanthematous lesions or discoloration noted on facial skin         [] Abnormal -            Psychiatric:       [x] Normal Affect [] Abnormal -        [x] No Hallucinations    Other pertinent observable physical exam findings:-            Franklin Guillen, was evaluated through a synchronous (real-time) audio-video encounter. The patient (or guardian if applicable) is aware that this is a billable service. Verbal consent to proceed has been obtained within the past 12 months. The visit was conducted pursuant to the emergency declaration under the [address] [place] [address] authority and the [employer] Act. Patient identification was verified, and a caregiver was present when appropriate. The patient was located in a state where the provider was credentialed to provide care. An electronic signature was used to authenticate this note.     --GABRIEL Weiss - CNP